# Patient Record
Sex: MALE | Race: WHITE | NOT HISPANIC OR LATINO | URBAN - NONMETROPOLITAN AREA
[De-identification: names, ages, dates, MRNs, and addresses within clinical notes are randomized per-mention and may not be internally consistent; named-entity substitution may affect disease eponyms.]

---

## 2019-04-02 ENCOUNTER — IMPORTED ENCOUNTER (OUTPATIENT)
Dept: URBAN - NONMETROPOLITAN AREA CLINIC 1 | Facility: CLINIC | Age: 39
End: 2019-04-02

## 2019-04-02 PROBLEM — T15.12XA: Noted: 2019-04-02

## 2019-04-02 PROBLEM — T15.02XA: Noted: 2019-04-02

## 2019-04-02 PROBLEM — S05.02XA: Noted: 2019-04-02

## 2019-04-02 PROCEDURE — 65205 REMOVE FOREIGN BODY FROM EYE: CPT

## 2019-04-02 PROCEDURE — 65222 REMOVE FOREIGN BODY FROM EYE: CPT

## 2019-04-02 PROCEDURE — 99203 OFFICE O/P NEW LOW 30 MIN: CPT

## 2019-04-02 NOTE — PATIENT DISCUSSION
Corneal Abrasion 2* Corneal FB and Conjunctival FB OS-  discussed findings w/patient-  metalic FB removed from LLL w/Qtip at -  corneal FB removed w/ 25g needle at -  start Maxitrol QID OS x 1 week -  montior 1 week f/u corneal abrasion OS

## 2020-03-18 PROBLEM — G44.52 NEW DAILY PERSISTENT HEADACHE: Status: ACTIVE | Noted: 2018-03-20

## 2020-03-18 PROBLEM — H91.90 DECREASED HEARING: Status: ACTIVE | Noted: 2018-03-20

## 2020-03-18 PROBLEM — R00.2 PALPITATIONS: Status: ACTIVE | Noted: 2018-10-28

## 2020-07-29 PROBLEM — R63.4 WEIGHT LOSS: Status: ACTIVE | Noted: 2020-07-29

## 2020-07-29 PROBLEM — N17.9 AKI (ACUTE KIDNEY INJURY) (HCC): Status: ACTIVE | Noted: 2020-07-29

## 2020-07-29 PROBLEM — R11.0 NAUSEA: Status: ACTIVE | Noted: 2020-07-29

## 2020-07-29 PROBLEM — R10.13 EPIGASTRIC PAIN: Status: ACTIVE | Noted: 2020-07-29

## 2020-07-29 PROBLEM — R63.0 ANOREXIA: Status: ACTIVE | Noted: 2020-07-29

## 2020-09-25 ENCOUNTER — IMPORTED ENCOUNTER (OUTPATIENT)
Dept: URBAN - NONMETROPOLITAN AREA CLINIC 1 | Facility: CLINIC | Age: 40
End: 2020-09-25

## 2020-09-25 PROBLEM — H52.4: Noted: 2020-09-25

## 2020-09-25 PROBLEM — H25.013: Noted: 2020-09-25

## 2020-09-25 PROCEDURE — 99213 OFFICE O/P EST LOW 20 MIN: CPT

## 2020-09-25 NOTE — PATIENT DISCUSSION
Presbyopia - quick MR done today; prescription given if patient choosesCataracts OU -trace  - discussed diagnosis in detail w/ patient - discussed signs and symptoms associated - recommend UV protection Unknown Etiology - patient has been hospitalized in/out for 8 weeks - no diagnosis yet - patient experiencing exhaustion nausea aches blurred vision etc. - no ocular manifestations noted on today's exam - optic nerve appears normal - patient has infectious disease conult in progress

## 2021-01-11 ENCOUNTER — IMPORTED ENCOUNTER (OUTPATIENT)
Dept: URBAN - NONMETROPOLITAN AREA CLINIC 1 | Facility: CLINIC | Age: 41
End: 2021-01-11

## 2021-01-11 PROBLEM — H16.223: Noted: 2021-01-11

## 2021-01-11 PROBLEM — H10.423: Noted: 2021-01-11

## 2021-01-11 PROCEDURE — 99213 OFFICE O/P EST LOW 20 MIN: CPT

## 2021-01-11 NOTE — PATIENT DISCUSSION
STACY OU/Ocular Allegies OU-  discussed findings w/ patient-  start Lotemax TID OU x 2 weeks-  RTC 2 weeks f/u

## 2021-01-28 ENCOUNTER — IMPORTED ENCOUNTER (OUTPATIENT)
Dept: URBAN - NONMETROPOLITAN AREA CLINIC 1 | Facility: CLINIC | Age: 41
End: 2021-01-28

## 2021-01-28 PROCEDURE — 99213 OFFICE O/P EST LOW 20 MIN: CPT

## 2021-01-28 NOTE — PATIENT DISCUSSION
STACY OU/Ocular Allegies OU-  discussed findings w/ patient-  taper Lotemax QD OU x 1 week or prn-  RTC as scheduled or prn

## 2021-02-03 PROBLEM — M79.89 MASS OF SOFT TISSUE OF CHEST: Status: ACTIVE | Noted: 2020-05-07

## 2021-02-03 PROBLEM — R53.1 WEAKNESS: Status: ACTIVE | Noted: 2020-10-11

## 2021-02-12 ENCOUNTER — IMPORTED ENCOUNTER (OUTPATIENT)
Dept: URBAN - NONMETROPOLITAN AREA CLINIC 1 | Facility: CLINIC | Age: 41
End: 2021-02-12

## 2021-02-12 PROBLEM — H16.223: Noted: 2021-01-11

## 2021-02-12 PROBLEM — H10.423: Noted: 2021-02-12

## 2021-02-12 PROCEDURE — 92012 INTRM OPH EXAM EST PATIENT: CPT

## 2021-02-12 NOTE — PATIENT DISCUSSION
STACY OU/Ocular Allegies OU- Discussed findings w/ patient- Continue Predisone as prescribed by Neurologist- Continue follow up with Neurologist; Dr's Notes: Neuro: Dr. Janine Ortiz

## 2021-10-19 ENCOUNTER — TRANSCRIBE ORDER (OUTPATIENT)
Dept: PREADMISSION TESTING | Age: 41
End: 2021-10-19

## 2021-10-19 ENCOUNTER — TRANSCRIBE ORDER (OUTPATIENT)
Dept: GENERAL RADIOLOGY | Age: 41
End: 2021-10-19

## 2022-03-18 PROBLEM — H91.90 DECREASED HEARING: Status: ACTIVE | Noted: 2018-03-20

## 2022-03-19 PROBLEM — R63.4 WEIGHT LOSS: Status: ACTIVE | Noted: 2020-07-29

## 2022-03-19 PROBLEM — R63.0 ANOREXIA: Status: ACTIVE | Noted: 2020-07-29

## 2022-03-19 PROBLEM — G44.52 NEW DAILY PERSISTENT HEADACHE: Status: ACTIVE | Noted: 2018-03-20

## 2022-03-19 PROBLEM — R10.13 EPIGASTRIC PAIN: Status: ACTIVE | Noted: 2020-07-29

## 2022-03-19 PROBLEM — N17.9 AKI (ACUTE KIDNEY INJURY) (HCC): Status: ACTIVE | Noted: 2020-07-29

## 2022-03-20 PROBLEM — R00.2 PALPITATIONS: Status: ACTIVE | Noted: 2018-10-28

## 2022-03-20 PROBLEM — R53.1 WEAKNESS: Status: ACTIVE | Noted: 2020-10-11

## 2022-03-20 PROBLEM — M79.89 MASS OF SOFT TISSUE OF CHEST: Status: ACTIVE | Noted: 2020-05-07

## 2022-03-20 PROBLEM — R11.0 NAUSEA: Status: ACTIVE | Noted: 2020-07-29

## 2022-04-09 ASSESSMENT — VISUAL ACUITY
OS_CC: 20/20
OD_CC: 20/20-2
OD_CC: 20/20
OD_CC: 20/25
OS_CC: 20/20
OS_CC: 20/20-1
OU_CC: 20/20-
OD_CC: 20/25-1
OS_SC: 20/20
OD_SC: 20/20
OD_CC: 20/20-2
OS_CC: 20/20-
OS_CC: 20/20

## 2022-04-09 ASSESSMENT — TONOMETRY
OS_IOP_MMHG: 16
OS_IOP_MMHG: 15
OD_IOP_MMHG: 15
OD_IOP_MMHG: 16
OD_IOP_MMHG: 16
OS_IOP_MMHG: 17
OD_IOP_MMHG: 16
OS_IOP_MMHG: 15

## 2023-05-20 RX ORDER — OMEPRAZOLE 40 MG/1
40 CAPSULE, DELAYED RELEASE ORAL 2 TIMES DAILY
COMMUNITY

## 2025-04-08 ENCOUNTER — EMERGENCY VISIT (OUTPATIENT)
Age: 45
End: 2025-04-08

## 2025-04-08 DIAGNOSIS — H16.223: ICD-10-CM

## 2025-04-08 PROCEDURE — 99213 OFFICE O/P EST LOW 20 MIN: CPT

## 2025-06-30 ENCOUNTER — EMERGENCY VISIT (OUTPATIENT)
Age: 45
End: 2025-06-30

## 2025-06-30 DIAGNOSIS — H16.223: ICD-10-CM

## 2025-06-30 DIAGNOSIS — H10.812: ICD-10-CM

## 2025-06-30 PROCEDURE — 99213 OFFICE O/P EST LOW 20 MIN: CPT
